# Patient Record
Sex: MALE | Race: WHITE | Employment: OTHER | URBAN - METROPOLITAN AREA
[De-identification: names, ages, dates, MRNs, and addresses within clinical notes are randomized per-mention and may not be internally consistent; named-entity substitution may affect disease eponyms.]

---

## 2017-03-07 RX ORDER — TIOTROPIUM BROMIDE 18 UG/1
CAPSULE ORAL; RESPIRATORY (INHALATION)
Qty: 90 CAPSULE | Refills: 3 | Status: SHIPPED | OUTPATIENT
Start: 2017-03-07

## 2017-04-06 ENCOUNTER — OFFICE VISIT (OUTPATIENT)
Dept: PULMONOLOGY | Age: 72
End: 2017-04-06

## 2017-04-06 VITALS
HEIGHT: 70 IN | RESPIRATION RATE: 20 BRPM | WEIGHT: 200 LBS | HEART RATE: 97 BPM | SYSTOLIC BLOOD PRESSURE: 128 MMHG | DIASTOLIC BLOOD PRESSURE: 84 MMHG | OXYGEN SATURATION: 97 % | BODY MASS INDEX: 28.63 KG/M2

## 2017-04-06 DIAGNOSIS — J44.9 COPD, SEVERE (HCC): Primary | ICD-10-CM

## 2017-04-06 PROCEDURE — 99213 OFFICE O/P EST LOW 20 MIN: CPT | Performed by: INTERNAL MEDICINE

## 2017-05-09 ENCOUNTER — TELEPHONE (OUTPATIENT)
Dept: CARDIOLOGY CLINIC | Age: 72
End: 2017-05-09

## 2017-05-11 ENCOUNTER — NURSE ONLY (OUTPATIENT)
Dept: PULMONOLOGY | Age: 72
End: 2017-05-11

## 2017-05-11 DIAGNOSIS — J44.9 COPD, SEVERE (HCC): ICD-10-CM

## 2017-05-11 LAB
DLCO %PRED: NORMAL
DLCO PRE: NORMAL
FEF 25-75%-POST: 0.45
FEF 25-75%-PRE: 0.23
FEV1-POST: 0.78
FEV1-PRE: 0.73
FEV1/FVC-POST: 34
FEV1/FVC-PRE: 48.1
FVC-POST: 2.3
FVC-PRE: 1.51
MEP: NORMAL
MIP: NORMAL
TLC %PRED: NORMAL
TLC PRE: NORMAL

## 2017-05-11 PROCEDURE — 94060 EVALUATION OF WHEEZING: CPT | Performed by: INTERNAL MEDICINE

## 2017-05-11 ASSESSMENT — PULMONARY FUNCTION TESTS
FEV1_PRE: 0.73
FEV1/FVC_PRE: 48.1
FVC_PRE: 1.51
FEV1/FVC_POST: 34.0
FVC_POST: 2.30
FEV1_POST: 0.78

## 2017-10-09 ENCOUNTER — OFFICE VISIT (OUTPATIENT)
Dept: PULMONOLOGY | Age: 72
End: 2017-10-09

## 2017-10-09 VITALS
SYSTOLIC BLOOD PRESSURE: 128 MMHG | HEART RATE: 84 BPM | HEIGHT: 70 IN | WEIGHT: 204 LBS | DIASTOLIC BLOOD PRESSURE: 72 MMHG | BODY MASS INDEX: 29.2 KG/M2 | OXYGEN SATURATION: 97 %

## 2017-10-09 DIAGNOSIS — J44.9 COPD, SEVERE (HCC): Primary | ICD-10-CM

## 2017-10-09 PROCEDURE — 99213 OFFICE O/P EST LOW 20 MIN: CPT | Performed by: INTERNAL MEDICINE

## 2017-10-09 RX ORDER — ALBUTEROL SULFATE 2.5 MG/3ML
2.5 SOLUTION RESPIRATORY (INHALATION) EVERY 6 HOURS PRN
Qty: 360 EACH | Refills: 3 | Status: SHIPPED | OUTPATIENT
Start: 2017-10-09

## 2017-10-09 NOTE — PROGRESS NOTES
SUBJECTIVE:  Chief complaintsevere COPD   Bautista states that he's had no recent bronchitic episodes. He does have some mild wheezing daily. He continues on Advair, Spiriva and uses either albuterol MDI or solution per nebulizer up to 4 times a day. He denies chest pain or hemoptysis. OBJECTIVE:  /72   Pulse 84   Ht 5' 10\" (1.778 m)   Wt 204 lb (92.5 kg)   SpO2 97%   BMI 29.27 kg/m²      Physical Exam:  Constitutional:  He appears well developed and well-nourished. Neck:  Supple, No palpable lymphadenopathy, No JVD  Cardiovascular:  S1, S2 Normal, Regular rhythm, no murmurs or gallops, No pericardial  rubs. Pulmonary:  Mild his Inspiratory and expiratory wheezing bilaterally. Breath sounds are generally diminished throughout all areas. No pleural rubs  Abdomen: No epigastric pain, No distention. No organomegaly   Extremities: no edema, No DVT  Neurologic:  Awake and Alert, No focal deficits    Radiology: His last chest x-ray on 12/27/16 showed no acute process  PFT: Office spirometry 5/11/17 demonstrated severe COPD with a significant response to bronchodilator in his small airways        ASSESSMENT:    1. COPD, severe ([address]          PLAN:  I did renew his albuterol solution for his nebulizer. I will repeat his chest x-ray in near future. I will continue to follow him  Return in about 4 months (around 2/9/2018) for Recheck for COPD. This dictation was performed with a verbal recognition program and it was checked for errors. It is possible that there are still dictated errors within this office note. Any errors should be brought immediately to my attention for correction. All efforts were made to ensure that this office note is accurate.

## 2017-11-30 RX ORDER — TIOTROPIUM BROMIDE 18 UG/1
CAPSULE ORAL; RESPIRATORY (INHALATION)
Qty: 90 CAPSULE | Refills: 3 | Status: SHIPPED | OUTPATIENT
Start: 2017-11-30

## 2018-02-01 ENCOUNTER — HOSPITAL ENCOUNTER (OUTPATIENT)
Dept: GENERAL RADIOLOGY | Age: 73
Discharge: HOME | End: 2018-02-01
Attending: INTERNAL MEDICINE | Admitting: INTERNAL MEDICINE

## 2018-02-01 DIAGNOSIS — J44.9 OBSTRUCTIVE CHRONIC BRONCHITIS WITHOUT EXACERBATION (HCC): ICD-10-CM

## 2018-02-26 ENCOUNTER — OFFICE VISIT (OUTPATIENT)
Dept: PULMONOLOGY | Age: 73
End: 2018-02-26

## 2018-02-26 VITALS
RESPIRATION RATE: 20 BRPM | OXYGEN SATURATION: 96 % | HEART RATE: 84 BPM | WEIGHT: 213 LBS | BODY MASS INDEX: 31.55 KG/M2 | SYSTOLIC BLOOD PRESSURE: 130 MMHG | HEIGHT: 69 IN | DIASTOLIC BLOOD PRESSURE: 78 MMHG

## 2018-02-26 DIAGNOSIS — G47.34 NOCTURNAL HYPOXEMIA: ICD-10-CM

## 2018-02-26 DIAGNOSIS — J44.9 COPD, SEVERE (HCC): Primary | ICD-10-CM

## 2018-02-26 PROCEDURE — G8427 DOCREV CUR MEDS BY ELIG CLIN: HCPCS | Performed by: INTERNAL MEDICINE

## 2018-02-26 PROCEDURE — 99213 OFFICE O/P EST LOW 20 MIN: CPT | Performed by: INTERNAL MEDICINE

## 2018-02-26 PROCEDURE — 1123F ACP DISCUSS/DSCN MKR DOCD: CPT | Performed by: INTERNAL MEDICINE

## 2018-02-26 PROCEDURE — 3017F COLORECTAL CA SCREEN DOC REV: CPT | Performed by: INTERNAL MEDICINE

## 2018-02-26 PROCEDURE — 4040F PNEUMOC VAC/ADMIN/RCVD: CPT | Performed by: INTERNAL MEDICINE

## 2018-02-26 PROCEDURE — G8417 CALC BMI ABV UP PARAM F/U: HCPCS | Performed by: INTERNAL MEDICINE

## 2018-02-26 PROCEDURE — G8484 FLU IMMUNIZE NO ADMIN: HCPCS | Performed by: INTERNAL MEDICINE

## 2018-02-26 PROCEDURE — 1036F TOBACCO NON-USER: CPT | Performed by: INTERNAL MEDICINE

## 2018-02-26 PROCEDURE — G8926 SPIRO NO PERF OR DOC: HCPCS | Performed by: INTERNAL MEDICINE

## 2018-02-26 PROCEDURE — 3023F SPIROM DOC REV: CPT | Performed by: INTERNAL MEDICINE

## 2018-04-01 ENCOUNTER — HOSPITAL ENCOUNTER (OUTPATIENT)
Dept: OTHER | Age: 73
Discharge: HOME | End: 2018-04-01

## 2018-04-02 RX ORDER — FLUTICASONE PROPIONATE AND SALMETEROL XINAFOATE 230; 21 UG/1; UG/1
AEROSOL, METERED RESPIRATORY (INHALATION)
Qty: 36 G | Refills: 3 | Status: SHIPPED | OUTPATIENT
Start: 2018-04-02